# Patient Record
Sex: FEMALE
[De-identification: names, ages, dates, MRNs, and addresses within clinical notes are randomized per-mention and may not be internally consistent; named-entity substitution may affect disease eponyms.]

---

## 2018-10-25 ENCOUNTER — HOSPITAL ENCOUNTER (OUTPATIENT)
Dept: HOSPITAL 92 - BICULT | Age: 63
Discharge: HOME | End: 2018-10-25
Attending: FAMILY MEDICINE
Payer: COMMERCIAL

## 2018-10-25 ENCOUNTER — HOSPITAL ENCOUNTER (OUTPATIENT)
Dept: HOSPITAL 92 - BICMAMMO | Age: 63
Discharge: HOME | End: 2018-10-25
Attending: FAMILY MEDICINE
Payer: COMMERCIAL

## 2018-10-25 DIAGNOSIS — E04.1: Primary | ICD-10-CM

## 2018-10-25 DIAGNOSIS — I87.2: ICD-10-CM

## 2018-10-25 DIAGNOSIS — I11.0: ICD-10-CM

## 2018-10-25 DIAGNOSIS — E07.9: ICD-10-CM

## 2018-10-25 DIAGNOSIS — I80.9: ICD-10-CM

## 2018-10-25 DIAGNOSIS — Z12.31: Primary | ICD-10-CM

## 2018-10-25 PROCEDURE — 77063 BREAST TOMOSYNTHESIS BI: CPT

## 2018-10-25 PROCEDURE — 93970 EXTREMITY STUDY: CPT

## 2018-10-25 PROCEDURE — 76536 US EXAM OF HEAD AND NECK: CPT

## 2018-10-25 PROCEDURE — 77067 SCR MAMMO BI INCL CAD: CPT

## 2018-10-25 PROCEDURE — 71045 X-RAY EXAM CHEST 1 VIEW: CPT

## 2018-10-25 NOTE — ULT
BILATERAL VENOUS DOPPLER ULTRASOUND

10/25/18

 

COMPARISON:  

None.

 

HISTORY: 

Pain, swelling and edema, assess for DVT.

 

TECHNIQUE:  

Multiplanar gray scale sonographic imaging of the venous structures of the bilateral lower extremity 
is obtained with color flow and spectral analysis. 

 

FINDINGS:  

Bilateral common femoral veins, profunda femoral veins, femoral veins, greater saphenous veins, popli
teal veins and posterior tibial veins are patent. There is normal blood flow, augmentation and compre
ssion within the deep venous system bilaterally. No evidence for DVT on either side. 

 

IMPRESSION:  

No evidence for deep venous thrombosis of either lower extremity. 

 

Check tech note. 

 

POS: [facility]

## 2018-10-25 NOTE — RAD
CHEST ONE VIEW:

 

History: 

63-year-old female with history of abnormal finding in the lung field, R91.8.

 

Comparison: 

[id number]

 

FINDINGS: 

Heart size is within normal limits. The lungs are clear. No pneumonia, edema, or pleural effusion. 

 

IMPRESSION: 

No acute intrathoracic disease. Heart size is within normal limits. The lungs are clear. No pneumonia
, edema, pleural effusion, or other acute process. 

 

IMPRESSION: 

No acute intrathoracic disease. Atherosclerosis of the aorta. 

 

POS: [facility]

## 2018-10-25 NOTE — ULT
THYROID ULTRASOUND

10/25/18

 

HISTORY: 

Thyroid fullness. Evaluate for mass. 

 

Multiple longitudinal and transverse images of the thyroid gland is obtained using a multihertz linea
r array transducer.  Real time and color flow images demonstrate both thyroid lobes to be visualized.
 Both thyroid lobes are slightly heterogeneous in density. The right thyroid lobe measures 2.9 x 6.1 
x 3.7 cm while the left thyroid lobe measures 2.0 x 5.2 x 2.8 cm. No dominant mass is seen. A tiny ri
ght thyroid lobe mixed echogenic partially cystic and partially solid lesion is seen measuring approx
imately 5 x 3 x 6 mm. No other significant abnormality seen.

 

IMPRESSION:  

Somewhat bulky bilateral fairly symmetric thyroid lobes with tiny right thyroid density. 

 

 

 

POS: [facility]